# Patient Record
Sex: FEMALE
[De-identification: names, ages, dates, MRNs, and addresses within clinical notes are randomized per-mention and may not be internally consistent; named-entity substitution may affect disease eponyms.]

---

## 2023-09-07 ENCOUNTER — HOSPITAL ENCOUNTER (OUTPATIENT)
Dept: HOSPITAL 90 - RAH | Age: 83
Discharge: HOME | End: 2023-09-07
Attending: INTERNAL MEDICINE
Payer: MEDICARE

## 2023-09-07 DIAGNOSIS — Z93.1: Primary | ICD-10-CM

## 2023-09-07 PROCEDURE — 74018 RADEX ABDOMEN 1 VIEW: CPT

## 2025-03-14 ENCOUNTER — HOSPITAL ENCOUNTER (OUTPATIENT)
Dept: HOSPITAL 90 - RAH | Age: 85
Discharge: HOME | End: 2025-03-14
Attending: INTERNAL MEDICINE
Payer: MEDICARE

## 2025-03-14 DIAGNOSIS — K94.20: Primary | ICD-10-CM

## 2025-03-14 PROCEDURE — 74018 RADEX ABDOMEN 1 VIEW: CPT

## 2025-03-14 NOTE — [facility]
Exam Type: ABD 1VW



Clinical Information: GASTROSTOMY COMPLICATION



Comparison: None



Findings:



XR Eval Gastrostomy Perc W/ Contrast





TECHNIQUE:

Single view of the abdomen was obtained. 30 cc of Gastrografin injected

through the gastric tube.





Contrast outlines the stomach and small bowel. There is no evidence of

extravasation.



Bowel gas pattern is otherwise unremarkable.



IMPRESSION:

Gastric tube in good position within the stomach.